# Patient Record
Sex: MALE | Race: WHITE | Employment: OTHER | URBAN - METROPOLITAN AREA
[De-identification: names, ages, dates, MRNs, and addresses within clinical notes are randomized per-mention and may not be internally consistent; named-entity substitution may affect disease eponyms.]

---

## 2017-02-03 ENCOUNTER — HOSPITAL ENCOUNTER (EMERGENCY)
Facility: HOSPITAL | Age: 62
Discharge: HOME | End: 2017-02-03
Attending: EMERGENCY MEDICINE
Payer: MEDICARE

## 2017-02-03 VITALS
HEIGHT: 70 IN | TEMPERATURE: 97 F | SYSTOLIC BLOOD PRESSURE: 158 MMHG | WEIGHT: 180 LBS | RESPIRATION RATE: 19 BRPM | BODY MASS INDEX: 25.77 KG/M2 | HEART RATE: 83 BPM | DIASTOLIC BLOOD PRESSURE: 86 MMHG | OXYGEN SATURATION: 100 %

## 2017-02-03 DIAGNOSIS — F90.9 ATTENTION DEFICIT HYPERACTIVITY DISORDER (ADHD), UNSPECIFIED ADHD TYPE: ICD-10-CM

## 2017-02-03 DIAGNOSIS — Z76.0 ENCOUNTER FOR MEDICATION REFILL: Primary | ICD-10-CM

## 2017-02-03 PROCEDURE — 99283 EMERGENCY DEPT VISIT LOW MDM: CPT

## 2017-02-03 RX ORDER — DEXTROAMPHETAMINE SACCHARATE, AMPHETAMINE ASPARTATE, DEXTROAMPHETAMINE SULFATE AND AMPHETAMINE SULFATE 7.5; 7.5; 7.5; 7.5 MG/1; MG/1; MG/1; MG/1
30 TABLET ORAL 2 TIMES DAILY
Qty: 13 TABLET | Refills: 0 | Status: SHIPPED | OUTPATIENT
Start: 2017-04-04 | End: 2017-04-09

## 2017-02-03 NOTE — ED PROVIDER NOTES
Patient Seen in: [facility] Emergency Department    History   Patient presents with:  Medication Request    Stated Complaint: PRESCRIPTION REFILL    HPI    he presents for medication refill.   He states that he takes Adderall twice a day sometimes difficulty  Eye:  No scleral icterus. Eyelids appear normal, no lesions. Musculoskeletal:  Good muscle tone. Skin:  Warm, dry, well perfused. Good skin turgor. No rashes seen. Neurology:  Moving all extremities equally with good coordination.   No cra

## 2017-06-01 ENCOUNTER — HOSPITAL ENCOUNTER (EMERGENCY)
Facility: HOSPITAL | Age: 62
Discharge: HOME | End: 2017-06-01
Attending: EMERGENCY MEDICINE
Payer: MEDICARE

## 2017-06-01 VITALS
TEMPERATURE: 99 F | RESPIRATION RATE: 16 BRPM | OXYGEN SATURATION: 100 % | DIASTOLIC BLOOD PRESSURE: 85 MMHG | SYSTOLIC BLOOD PRESSURE: 172 MMHG | WEIGHT: 180 LBS | HEART RATE: 99 BPM | BODY MASS INDEX: 26 KG/M2

## 2017-06-01 DIAGNOSIS — F90.9 ATTENTION DEFICIT HYPERACTIVITY DISORDER (ADHD), UNSPECIFIED ADHD TYPE: ICD-10-CM

## 2017-06-01 DIAGNOSIS — Z76.0 ENCOUNTER FOR MEDICATION REFILL: Primary | ICD-10-CM

## 2017-06-01 PROCEDURE — 99283 EMERGENCY DEPT VISIT LOW MDM: CPT

## 2017-06-01 RX ORDER — DEXTROAMPHETAMINE SACCHARATE, AMPHETAMINE ASPARTATE, DEXTROAMPHETAMINE SULFATE AND AMPHETAMINE SULFATE 7.5; 7.5; 7.5; 7.5 MG/1; MG/1; MG/1; MG/1
30 TABLET ORAL 4 TIMES DAILY
Qty: 16 TABLET | Refills: 0 | Status: SHIPPED | OUTPATIENT
Start: 2017-06-01 | End: 2017-06-05

## 2017-06-03 NOTE — ED PROVIDER NOTES
Patient Seen in: [facility] AND [facility] Emergency Department    History   Patient presents with:  Medication Administration      HPI    Patient presents requesting a refill of his Adderall.   He states that he is unable to follow-up with his psychiatrist until appears well-developed and well-nourished. No distress. HENT:   Head: Normocephalic and atraumatic. Nose: Nose normal.   Eyes: Right eye exhibits no discharge. Left eye exhibits no discharge. Cardiovascular: Normal rate. No murmur heard.   Pulmonar

## 2017-10-12 ENCOUNTER — HOSPITAL ENCOUNTER (EMERGENCY)
Facility: HOSPITAL | Age: 62
Discharge: HOME | End: 2017-10-12
Attending: EMERGENCY MEDICINE
Payer: MEDICARE

## 2017-10-12 ENCOUNTER — APPOINTMENT (OUTPATIENT)
Dept: GENERAL RADIOLOGY | Facility: HOSPITAL | Age: 62
End: 2017-10-12
Attending: EMERGENCY MEDICINE
Payer: MEDICARE

## 2017-10-12 VITALS
DIASTOLIC BLOOD PRESSURE: 82 MMHG | WEIGHT: 180 LBS | OXYGEN SATURATION: 97 % | HEART RATE: 71 BPM | RESPIRATION RATE: 18 BRPM | SYSTOLIC BLOOD PRESSURE: 142 MMHG | HEIGHT: 70 IN | BODY MASS INDEX: 25.77 KG/M2 | TEMPERATURE: 97 F

## 2017-10-12 DIAGNOSIS — M25.552 LEFT HIP PAIN: Primary | ICD-10-CM

## 2017-10-12 PROCEDURE — 73502 X-RAY EXAM HIP UNI 2-3 VIEWS: CPT | Performed by: EMERGENCY MEDICINE

## 2017-10-12 PROCEDURE — 99283 EMERGENCY DEPT VISIT LOW MDM: CPT

## 2017-10-12 RX ORDER — IBUPROFEN 600 MG/1
600 TABLET ORAL EVERY 8 HOURS PRN
Qty: 20 TABLET | Refills: 0 | Status: SHIPPED | OUTPATIENT
Start: 2017-10-12 | End: 2017-10-19

## 2017-10-12 RX ORDER — IBUPROFEN 600 MG/1
600 TABLET ORAL ONCE
Status: COMPLETED | OUTPATIENT
Start: 2017-10-12 | End: 2017-10-12

## 2017-10-12 NOTE — ED PROVIDER NOTES
Patient Seen in: [facility] AND [facility] Emergency Department    History   Patient presents with:  Lower Extremity Injury (musculoskeletal)    Stated Complaint: l hip pain    HPI    82-year-old male with history of ADHD here with complaints of chronic left hip in HPI. Constitutional and vital signs reviewed. All other systems reviewed and negative except as noted above. PSFH elements reviewed from today and agreed except as otherwise stated in HPI.     Physical Exam   ED Triage Vitals [10/12/17 0110]  BP hour(s)). Imaging Results Available and Reviewed by me while in ED:      X-ray pelvis and left hip 3 views      IMPRESSION:  -No evidence of acute bony or joint abnormality.     The results were faxed/finalized only on Thursday, October 12, 2017 at 03:25 multiple initial diagnoses were considered based on the presenting problem including fracture, arthritis, capsulitis.       Disposition and Plan     Clinical Impression:  Left hip pain  (primary encounter diagnosis)    Disposition:  Discharge    Follow-up:
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
## 2018-01-03 NOTE — ED PROVIDER NOTES
Patient Seen in: [facility] AND [facility] Emergency Department    History   Patient presents with:  Medication Administration      HPI    Patient presents requesting a refill of his Adderall.   States he takes 5.5 tablets per day and is not seeing a psychiatrist note and vitals reviewed.       ED Course      Labs Reviewed - No data to display      Imaging Results Available and Reviewed while in ED:     ED Medications Administered: Medications - No data to display      [facility]      01/02/18  2105   BP: 148/74   Pulse: 9 R-0    !! - Potential duplicate medications found. Please discuss with provider.

## 2018-02-11 ENCOUNTER — HOSPITAL ENCOUNTER (EMERGENCY)
Facility: HOSPITAL | Age: 63
Discharge: HOME | End: 2018-02-11
Attending: EMERGENCY MEDICINE
Payer: MEDICARE

## 2018-02-11 ENCOUNTER — APPOINTMENT (OUTPATIENT)
Dept: GENERAL RADIOLOGY | Facility: HOSPITAL | Age: 63
End: 2018-02-11
Attending: EMERGENCY MEDICINE
Payer: MEDICARE

## 2018-02-11 VITALS
DIASTOLIC BLOOD PRESSURE: 78 MMHG | BODY MASS INDEX: 24.34 KG/M2 | TEMPERATURE: 98 F | RESPIRATION RATE: 20 BRPM | WEIGHT: 170 LBS | HEIGHT: 70 IN | HEART RATE: 80 BPM | SYSTOLIC BLOOD PRESSURE: 169 MMHG | OXYGEN SATURATION: 99 %

## 2018-02-11 DIAGNOSIS — M21.611 BUNION OF RIGHT FOOT: ICD-10-CM

## 2018-02-11 DIAGNOSIS — M79.671 ACUTE FOOT PAIN, RIGHT: Primary | ICD-10-CM

## 2018-02-11 PROCEDURE — 73630 X-RAY EXAM OF FOOT: CPT | Performed by: EMERGENCY MEDICINE

## 2018-02-11 PROCEDURE — 99283 EMERGENCY DEPT VISIT LOW MDM: CPT

## 2018-02-11 NOTE — ED PROVIDER NOTES
Patient Seen in: [facility] AND [facility] Emergency Department    History   Patient presents with: Foot Pain    Stated Complaint: Right foot pain- \"bunion issues\"- states \"I got scared before my surgical date. *    HPI    Patient presents with right foot cesar otherwise stated in HPI.     Physical Exam   ED Triage Vitals [02/11/18 1014]  BP: (!) 174/88  Pulse: 82  Resp: 20  Temp: (!) 97.5 °F (36.4 °C)  Temp src: Temporal  SpO2: 98 %  O2 Device: None (Room air)    Current:BP (!) 169/78   Pulse 80   Temp (!) 97.5 ° We recommend that you schedule follow up care with a primary care provider within the next three months to obtain basic health screening including reassessment of your blood pressure.       Clinical Impression:  Acute foot pain, right  (primary encounte

## 2018-02-11 NOTE — CM/SW NOTE
Met w/patient at bedside for dcp, per patient has [address] to pay cab upon discharge if needed. States was on his way to [employer] and that is where he wants to go upon discharge.

## 2018-02-11 NOTE — ED NOTES
Pt to ED via [facility] with EMS- per EMS, pt from [facility]- where veterans go with no money- states got on the [place] and came to [address]- states chronic bunion to right foot. States supposed to have surgery but canceled the day before. Now, having pain.  P

## 2018-02-11 NOTE — ED INITIAL ASSESSMENT (HPI)
Pt states chronic right foot \"bunion issues. \" Pt states he was supposed to get surgery on this but \"wussd out the day before because they side my foot may split and not get back together. \" Steady gait observed.  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

## 2018-05-16 ENCOUNTER — HOSPITAL ENCOUNTER (EMERGENCY)
Facility: HOSPITAL | Age: 63
Discharge: HOME | End: 2018-05-17
Attending: EMERGENCY MEDICINE
Payer: MEDICARE

## 2018-05-16 DIAGNOSIS — S33.5XXA SPRAIN OF LOW BACK, INITIAL ENCOUNTER: Primary | ICD-10-CM

## 2018-05-16 PROCEDURE — 99283 EMERGENCY DEPT VISIT LOW MDM: CPT

## 2018-05-17 ENCOUNTER — APPOINTMENT (OUTPATIENT)
Dept: GENERAL RADIOLOGY | Facility: HOSPITAL | Age: 63
End: 2018-05-17
Attending: EMERGENCY MEDICINE
Payer: MEDICARE

## 2018-05-17 VITALS
RESPIRATION RATE: 18 BRPM | DIASTOLIC BLOOD PRESSURE: 78 MMHG | HEART RATE: 81 BPM | TEMPERATURE: 98 F | SYSTOLIC BLOOD PRESSURE: 133 MMHG | WEIGHT: 175 LBS | BODY MASS INDEX: 24.5 KG/M2 | HEIGHT: 71 IN | OXYGEN SATURATION: 100 %

## 2018-05-17 PROCEDURE — 72100 X-RAY EXAM L-S SPINE 2/3 VWS: CPT | Performed by: EMERGENCY MEDICINE

## 2018-05-17 RX ORDER — IBUPROFEN 600 MG/1
600 TABLET ORAL EVERY 8 HOURS PRN
Qty: 20 TABLET | Refills: 0 | Status: SHIPPED | OUTPATIENT
Start: 2018-05-17 | End: 2018-05-24

## 2018-05-17 RX ORDER — LIDOCAINE 50 MG/G
1 PATCH TOPICAL ONCE
Status: DISCONTINUED | OUTPATIENT
Start: 2018-05-17 | End: 2018-05-17

## 2018-05-17 RX ORDER — LIDOCAINE 50 MG/G
1 PATCH TOPICAL EVERY 24 HOURS
Qty: 5 PATCH | Refills: 0 | Status: SHIPPED | OUTPATIENT
Start: 2018-05-17 | End: 2018-05-22

## 2018-05-17 NOTE — ED PROVIDER NOTES
Patient Seen in: [facility] Emergency Department    History   Patient presents with:  Back Pain (musculoskeletal)    Stated Complaint: back pain     HPI    63-year-old male with history of ADHD here with complaints of acute onset back pain ×1 day. reviewed. All other systems reviewed and negative except as noted above.     Physical Exam   ED Triage Vitals [05/16/18 2350]  BP: 146/84  Pulse: 83  Resp: 18  Temp: 98.1 °F (36.7 °C)  Temp src: Oral  SpO2: 100 %  O2 Device: None (Room air)    Current: XRAY LUMBAR SPINE    Comparison: 9/22/2014    IMPRESSION:  No acute fracture or dislocation. Mild compression of the L1 vertebral body, similar compared to prior.   Mild degenerative changes are seen, most prominent at T12-L1 and L1-L2, slightly progre MAKING:  After obtaining the patient's history, performing the physical exam and reviewing the diagnostics, multiple initial diagnoses were considered based on the presenting problem including fracture, contusion, sprain, sciatica.         Disposition and P

## 2018-07-04 ENCOUNTER — HOSPITAL (OUTPATIENT)
Dept: OTHER | Age: 63
End: 2018-07-04
Attending: EMERGENCY MEDICINE

## 2018-12-25 ENCOUNTER — HOSPITAL ENCOUNTER (OUTPATIENT)
Age: 63
Discharge: HOME | End: 2018-12-25
Attending: FAMILY MEDICINE
Payer: MEDICARE

## 2018-12-25 VITALS
DIASTOLIC BLOOD PRESSURE: 90 MMHG | SYSTOLIC BLOOD PRESSURE: 169 MMHG | TEMPERATURE: 97 F | HEART RATE: 75 BPM | RESPIRATION RATE: 18 BRPM | OXYGEN SATURATION: 98 %

## 2018-12-25 DIAGNOSIS — M21.611 BUNION, RIGHT FOOT: Primary | ICD-10-CM

## 2018-12-25 PROCEDURE — 99212 OFFICE O/P EST SF 10 MIN: CPT

## 2018-12-25 NOTE — ED INITIAL ASSESSMENT (HPI)
PATIENT PRESENTS WITH A BUNION TO HIS RIGHT FOOT STATES HE HAS HAD IT \"FOR YEARS\" BUT IT SEEMS TO BE \"GETTING WORSE. \"  STATES PAIN INCREASES WITH AMBULATION. NO OPEN SORES NOTED TO 1ST OR SECOND TOES.

## 2018-12-25 NOTE — ED PROVIDER NOTES
Patient Seen in: [address]    History   Patient presents with:  Bunions    Stated Complaint: pain in foot    HPI     Patient is here with the severe bunion of the right foot.   Says it is hard for him to bear weight on th agreed.     Disposition:  Discharge  12/25/2018  5:37 pm    Follow-up:  Dot Alexandre DPM  [address]  [address]  [phone]    Schedule an appointment as soon as possible for a visit

## 2018-12-30 ENCOUNTER — HOSPITAL ENCOUNTER (EMERGENCY)
Facility: HOSPITAL | Age: 63
Discharge: HOME | End: 2018-12-30
Attending: EMERGENCY MEDICINE
Payer: MEDICARE

## 2018-12-30 VITALS
OXYGEN SATURATION: 100 % | WEIGHT: 180 LBS | DIASTOLIC BLOOD PRESSURE: 86 MMHG | SYSTOLIC BLOOD PRESSURE: 160 MMHG | TEMPERATURE: 99 F | HEART RATE: 89 BPM | RESPIRATION RATE: 18 BRPM | BODY MASS INDEX: 26 KG/M2
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
## 2018-12-30 DIAGNOSIS — S80.01XA CONTUSION OF RIGHT KNEE, INITIAL ENCOUNTER: ICD-10-CM

## 2018-12-30 PROCEDURE — 99282 EMERGENCY DEPT VISIT SF MDM: CPT

## 2018-12-30 NOTE — ED NOTES
Pt verbalized understanding of discharge and follow up instructions, along with prescriptions.  Denies additional questioning  Stable upon discharge

## 2018-12-30 NOTE — ED PROVIDER NOTES
Patient Seen in: [facility] AND [facility] Emergency Department    History   Patient presents with:  Medication Request    Stated Complaint: MEDICATION REFILL    HPI    The patient is a 63-year-old male with a history of ADHD who presents for refill of his Adder heard.  Pulmonary/Chest: Effort normal and breath sounds normal.   Abdominal: Soft. Bowel sounds are normal. He exhibits no distension. There is no tenderness. Musculoskeletal: Normal range of motion.    Neurological: He is alert and oriented to person, p

## 2018-12-31 NOTE — ED PROVIDER NOTES
Patient Seen in: [facility] Emergency Department    History   Patient presents with:  Fall (musculoskeletal, neurologic)  Back Pain (musculoskeletal)      HPI    Patient presents to the ED after reportedly falling outside the ED.   He states that h deformity, laxity or reduced range of motion. Neurological: He is alert. Skin: Skin is warm and dry. Psychiatric: He has a normal mood and affect. His behavior is normal.   Nursing note and vitals reviewed.       ED Course      Labs Reviewed - No data possible for a visit in 1 week        Medications Prescribed:  Discharge Medication List as of 12/30/2018 10:32 PM

## 2018-12-31 NOTE — ED NOTES
PT STATED THAT INITIALLY HE CAME IN FOR THE SCRIPT [facility] [place] FILL HIS SCRIPT TILL TOMORROW. PT STATED THAT HE FELL BY THE ENTRANCE OF THE  INTO THE BUSHES. PT SAID THAT HE PROBABLY FELL ASLEEP WHILE WALKING DUE TO NARCOLEPSY.  PT C/O NOW OF TH

## 2019-08-01 ENCOUNTER — HOSPITAL ENCOUNTER (EMERGENCY)
Facility: HOSPITAL | Age: 64
Discharge: HOME | End: 2019-08-01
Attending: EMERGENCY MEDICINE
Payer: MEDICARE

## 2019-08-01 VITALS
DIASTOLIC BLOOD PRESSURE: 86 MMHG | WEIGHT: 185 LBS | RESPIRATION RATE: 18 BRPM | BODY MASS INDEX: 26.48 KG/M2 | OXYGEN SATURATION: 99 % | HEIGHT: 70 IN | HEART RATE: 79 BPM | SYSTOLIC BLOOD PRESSURE: 152 MMHG | TEMPERATURE: 98 F

## 2019-08-01 DIAGNOSIS — Z76.0 ENCOUNTER FOR MEDICATION REFILL: Primary | ICD-10-CM

## 2019-08-01 PROCEDURE — 99282 EMERGENCY DEPT VISIT SF MDM: CPT
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
## 2019-08-31 ENCOUNTER — HOSPITAL ENCOUNTER (EMERGENCY)
Facility: HOSPITAL | Age: 64
Discharge: HOME | End: 2019-08-31
Attending: EMERGENCY MEDICINE
Payer: MEDICARE

## 2019-08-31 VITALS
OXYGEN SATURATION: 96 % | DIASTOLIC BLOOD PRESSURE: 92 MMHG | TEMPERATURE: 98 F | HEART RATE: 91 BPM | SYSTOLIC BLOOD PRESSURE: 164 MMHG | RESPIRATION RATE: 18 BRPM

## 2019-08-31 DIAGNOSIS — Z76.0 ENCOUNTER FOR MEDICATION REFILL: Primary | ICD-10-CM

## 2019-08-31 PROCEDURE — 99283 EMERGENCY DEPT VISIT LOW MDM: CPT

## 2019-08-31 RX ORDER — DEXTROAMPHETAMINE SACCHARATE, AMPHETAMINE ASPARTATE, DEXTROAMPHETAMINE SULFATE AND AMPHETAMINE SULFATE 7.5; 7.5; 7.5; 7.5 MG/1; MG/1; MG/1; MG/1
30 TABLET ORAL
Qty: 20 TABLET | Refills: 0 | Status: SHIPPED | OUTPATIENT
Start: 2019-08-31 | End: 2019-09-05

## 2019-08-31 NOTE — ED INITIAL ASSESSMENT (HPI)
Pt presents with med request for Adderol. Pt seen by psychiatrist 2-3 wks ago. Denies n/v/d/f/c, abd pain, HA. Denies PMH  AOx3, RR even/nonlabored.

## 2019-08-31 NOTE — ED PROVIDER NOTES
Patient Seen in: [facility] Emergency Department    History   Patient presents with:  Medication Request    Stated Complaint: adderall prescription     HPI    Patient presents the emergency department stating that he ran out of his Adderall prescr supple. Cardiovascular: Normal rate and regular rhythm. No murmur heard. Pulmonary/Chest: Effort normal and breath sounds normal. No respiratory distress. Abdominal: Soft. He exhibits no distension. There is no tenderness.    Musculoskeletal: Normal

## 2019-08-31 NOTE — ED NOTES
Pt verbalized understanding of dc instructions, follow up with PMD. Pt hemodynamically stable. Well appearing.

## 2020-04-13 ENCOUNTER — HOSPITAL ENCOUNTER (EMERGENCY)
Facility: HOSPITAL | Age: 65
Discharge: HOME | End: 2020-04-13
Attending: EMERGENCY MEDICINE
Payer: MEDICARE

## 2020-04-13 VITALS
DIASTOLIC BLOOD PRESSURE: 90 MMHG | RESPIRATION RATE: 16 BRPM | TEMPERATURE: 97 F | SYSTOLIC BLOOD PRESSURE: 168 MMHG | HEIGHT: 70 IN | HEART RATE: 74 BPM | BODY MASS INDEX: 25.77 KG/M2 | WEIGHT: 180 LBS | OXYGEN SATURATION: 96 %

## 2020-04-13 DIAGNOSIS — H61.21 IMPACTED CERUMEN OF RIGHT EAR: Primary | ICD-10-CM

## 2020-04-13 PROCEDURE — 69209 REMOVE IMPACTED EAR WAX UNI: CPT

## 2020-04-13 NOTE — ED NOTES
Discharge instructions given to pt. Pt verbalized understanding of home care and ambulatory out of ED with steady gait. Pt discharged in stable condition.

## 2020-04-13 NOTE — ED PROVIDER NOTES
Patient Seen in: [facility] AND [facility] Emergency Department    History   Patient presents with:  Ear Problem Pain    Stated Complaint: cerumen impaction in right ear    HPI    Patient is here because he thinks he has wax stuck in his right ear.   He states he lesions. HEENT: Patient right ear has 100% cerumen impaction. There is no redness or occlusion of the canal the left has approximately 20 to 30% small amount of wax but otherwise the tympanic membrane appears normal  Musculoskeletal:  Good muscle tone.

## 2020-04-13 NOTE — ED INITIAL ASSESSMENT (HPI)
Pt reports that he feel discomfort in his right ear and decreased hearing that began this am after showering.

## 2020-12-19 ENCOUNTER — HOSPITAL ENCOUNTER (EMERGENCY)
Facility: HOSPITAL | Age: 65
Discharge: HOME | End: 2020-12-19
Attending: EMERGENCY MEDICINE
Payer: MEDICARE

## 2020-12-19 VITALS
RESPIRATION RATE: 20 BRPM | TEMPERATURE: 98 F | BODY MASS INDEX: 25.77 KG/M2 | SYSTOLIC BLOOD PRESSURE: 184 MMHG | HEIGHT: 70 IN | OXYGEN SATURATION: 97 % | WEIGHT: 180 LBS | DIASTOLIC BLOOD PRESSURE: 79 MMHG | HEART RATE: 75 BPM

## 2020-12-19 DIAGNOSIS — R03.0 ELEVATED BLOOD PRESSURE READING WITHOUT DIAGNOSIS OF HYPERTENSION: ICD-10-CM

## 2020-12-19 DIAGNOSIS — Z76.0 ENCOUNTER FOR MEDICATION REFILL: Primary | ICD-10-CM

## 2020-12-19 DIAGNOSIS — F90.9 ATTENTION DEFICIT HYPERACTIVITY DISORDER (ADHD), UNSPECIFIED ADHD TYPE: ICD-10-CM

## 2020-12-19 PROCEDURE — 99283 EMERGENCY DEPT VISIT LOW MDM: CPT

## 2020-12-19 RX ORDER — DEXTROAMPHETAMINE SACCHARATE, AMPHETAMINE ASPARTATE, DEXTROAMPHETAMINE SULFATE AND AMPHETAMINE SULFATE 7.5; 7.5; 7.5; 7.5 MG/1; MG/1; MG/1; MG/1
30 TABLET ORAL DAILY
Qty: 5 TABLET | Refills: 0 | Status: SHIPPED | OUTPATIENT
Start: 2020-12-19 | End: 2020-12-19

## 2020-12-19 RX ORDER — DEXTROAMPHETAMINE SACCHARATE, AMPHETAMINE ASPARTATE, DEXTROAMPHETAMINE SULFATE AND AMPHETAMINE SULFATE 7.5; 7.5; 7.5; 7.5 MG/1; MG/1; MG/1; MG/1
30 TABLET ORAL
Qty: 25 TABLET | Refills: 0 | Status: SHIPPED | OUTPATIENT
Start: 2020-12-19 | End: 2020-12-24

## 2020-12-19 NOTE — ED PROVIDER NOTES
Patient Seen in: [facility] AND [facility] Emergency Department      History   Patient presents with:  Medication Administration    Stated Complaint: Doctor not in.     HPI    The patient is a 82-year-old male with a history of ADHD who presents because he has be Mouth/Throat:      Mouth: Mucous membranes are moist.   Eyes:      Extraocular Movements: Extraocular movements intact. Conjunctiva/sclera: Conjunctivae normal.      Pupils: Pupils are equal, round, and reactive to light.    Neck:      Vascular: No JVD diagnosis of hypertension    Disposition:  Discharge  12/19/2020  1:30 pm    Follow-up:  Peiter Avalos MD  [facility] [address], [address]  [address] [facility] [address] [phone]    Sc

## 2021-07-05 ENCOUNTER — HOSPITAL ENCOUNTER (EMERGENCY)
Facility: HOSPITAL | Age: 66
Discharge: HOME | End: 2021-07-05
Attending: EMERGENCY MEDICINE
Payer: MEDICARE

## 2021-07-05 VITALS
HEART RATE: 99 BPM | WEIGHT: 185 LBS | SYSTOLIC BLOOD PRESSURE: 165 MMHG | TEMPERATURE: 98 F | OXYGEN SATURATION: 99 % | RESPIRATION RATE: 18 BRPM | BODY MASS INDEX: 27 KG/M2 | DIASTOLIC BLOOD PRESSURE: 79 MMHG

## 2021-07-05 DIAGNOSIS — Z76.0 ENCOUNTER FOR MEDICATION REFILL: Primary | ICD-10-CM

## 2021-07-05 PROCEDURE — 99283 EMERGENCY DEPT VISIT LOW MDM: CPT

## 2021-07-05 RX ORDER — DEXTROAMPHETAMINE SACCHARATE, AMPHETAMINE ASPARTATE, DEXTROAMPHETAMINE SULFATE AND AMPHETAMINE SULFATE 7.5; 7.5; 7.5; 7.5 MG/1; MG/1; MG/1; MG/1
30 TABLET ORAL
Qty: 25 TABLET | Refills: 0 | Status: SHIPPED | OUTPATIENT
Start: 2021-07-05 | End: 2021-07-10

## 2021-07-05 NOTE — ED PROVIDER NOTES
Patient Seen in: [facility] Emergency Department      History   Patient presents with:  Medication Request    Stated Complaint: med refill    HPI/Subjective:   HPI  Patient is a 68-year-old male history of ADHD on Adderall presents with medication He is not in acute distress. Appearance: Normal appearance. He is not toxic-appearing. HENT:      Head: Normocephalic. Mouth/Throat:      Mouth: Mucous membranes are moist.   Eyes:      Extraocular Movements: Extraocular movements intact.       [address]    Schedule an appointment as soon as possible for a visit      We recommend that you schedule follow up care with a primary care provider within the next three months to obtain basic health screening includ

## 2022-03-18 ENCOUNTER — HOSPITAL ENCOUNTER (EMERGENCY)
Facility: HOSPITAL | Age: 67
Discharge: HOME | End: 2022-03-18
Attending: EMERGENCY MEDICINE
Payer: MEDICARE

## 2022-03-18 VITALS
RESPIRATION RATE: 18 BRPM | OXYGEN SATURATION: 100 % | SYSTOLIC BLOOD PRESSURE: 172 MMHG | HEART RATE: 84 BPM | TEMPERATURE: 98 F | DIASTOLIC BLOOD PRESSURE: 86 MMHG

## 2022-03-18 DIAGNOSIS — R03.0 ELEVATED BLOOD PRESSURE READING WITHOUT DIAGNOSIS OF HYPERTENSION: ICD-10-CM

## 2022-03-18 DIAGNOSIS — H61.23 BILATERAL HEARING LOSS DUE TO CERUMEN IMPACTION: Primary | ICD-10-CM

## 2022-03-18 PROCEDURE — 99283 EMERGENCY DEPT VISIT LOW MDM: CPT

## 2022-03-18 NOTE — ED INITIAL ASSESSMENT (HPI)
Pt to ED for left ear pain onset two days ago with trouble hearing out of left ear. Denies cough, fever, recent illness.

## 2022-09-02 ENCOUNTER — HOSPITAL ENCOUNTER (EMERGENCY)
Facility: HOSPITAL | Age: 67
Discharge: HOME | End: 2022-09-02
Attending: EMERGENCY MEDICINE
Payer: MEDICARE

## 2022-09-02 VITALS
TEMPERATURE: 98 F | RESPIRATION RATE: 18 BRPM | OXYGEN SATURATION: 98 % | DIASTOLIC BLOOD PRESSURE: 88 MMHG | SYSTOLIC BLOOD PRESSURE: 174 MMHG | WEIGHT: 185 LBS | BODY MASS INDEX: 27 KG/M2 | HEART RATE: 74 BPM

## 2022-09-02 DIAGNOSIS — Z76.0 ENCOUNTER FOR MEDICATION REFILL: Primary | ICD-10-CM

## 2022-09-02 PROCEDURE — 99283 EMERGENCY DEPT VISIT LOW MDM: CPT

## 2022-09-02 RX ORDER — DEXTROAMPHETAMINE SACCHARATE, AMPHETAMINE ASPARTATE, DEXTROAMPHETAMINE SULFATE AND AMPHETAMINE SULFATE 7.5; 7.5; 7.5; 7.5 MG/1; MG/1; MG/1; MG/1
30 TABLET ORAL
Qty: 42 TABLET | Refills: 0 | Status: SHIPPED | OUTPATIENT
Start: 2022-09-02 | End: 2022-09-11

## 2022-10-16 ENCOUNTER — HOSPITAL ENCOUNTER (EMERGENCY)
Facility: HOSPITAL | Age: 67
Discharge: HOME | End: 2022-10-16
Attending: EMERGENCY MEDICINE
Payer: MEDICARE

## 2022-10-16 VITALS
SYSTOLIC BLOOD PRESSURE: 174 MMHG | HEART RATE: 85 BPM | DIASTOLIC BLOOD PRESSURE: 93 MMHG | OXYGEN SATURATION: 99 % | WEIGHT: 185 LBS | BODY MASS INDEX: 25.9 KG/M2 | HEIGHT: 71 IN | TEMPERATURE: 97 F | RESPIRATION RATE: 20 BRPM

## 2022-10-16 DIAGNOSIS — Z76.0 ENCOUNTER FOR MEDICATION REFILL: Primary | ICD-10-CM

## 2022-10-16 PROCEDURE — 99283 EMERGENCY DEPT VISIT LOW MDM: CPT

## 2022-10-16 RX ORDER — DEXTROAMPHETAMINE SACCHARATE, AMPHETAMINE ASPARTATE, DEXTROAMPHETAMINE SULFATE AND AMPHETAMINE SULFATE 7.5; 7.5; 7.5; 7.5 MG/1; MG/1; MG/1; MG/1
TABLET ORAL
Qty: 84 TABLET | Refills: 0 | Status: SHIPPED | OUTPATIENT
Start: 2022-10-16 | End: 2022-10-30

## 2024-02-14 ENCOUNTER — HOSPITAL ENCOUNTER (EMERGENCY)
Facility: HOSPITAL | Age: 69
Discharge: HOME | End: 2024-02-14
Attending: EMERGENCY MEDICINE
Payer: MEDICARE

## 2024-02-14 VITALS
HEART RATE: 80 BPM | BODY MASS INDEX: 27.2 KG/M2 | WEIGHT: 190 LBS | SYSTOLIC BLOOD PRESSURE: 172 MMHG | DIASTOLIC BLOOD PRESSURE: 94 MMHG | TEMPERATURE: 97 F | OXYGEN SATURATION: 99 % | HEIGHT: 70 IN | RESPIRATION RATE: 18 BRPM

## 2024-02-14 DIAGNOSIS — Z76.0 ENCOUNTER FOR MEDICATION REFILL: Primary | ICD-10-CM

## 2024-02-14 PROCEDURE — 99283 EMERGENCY DEPT VISIT LOW MDM: CPT

## 2024-02-14 PROCEDURE — 99282 EMERGENCY DEPT VISIT SF MDM: CPT

## 2024-02-14 RX ORDER — DEXTROAMPHETAMINE SACCHARATE, AMPHETAMINE ASPARTATE, DEXTROAMPHETAMINE SULFATE AND AMPHETAMINE SULFATE 7.5; 7.5; 7.5; 7.5 MG/1; MG/1; MG/1; MG/1
30 TABLET ORAL 4 TIMES DAILY
Qty: 60 TABLET | Refills: 0 | Status: SHIPPED | OUTPATIENT
Start: 2024-02-14 | End: 2024-02-29

## 2024-02-14 RX ORDER — DEXTROAMPHETAMINE SACCHARATE, AMPHETAMINE ASPARTATE, DEXTROAMPHETAMINE SULFATE AND AMPHETAMINE SULFATE 7.5; 7.5; 7.5; 7.5 MG/1; MG/1; MG/1; MG/1
60 TABLET ORAL EVERY MORNING
Qty: 30 TABLET | Refills: 0 | Status: SHIPPED | OUTPATIENT
Start: 2024-02-14 | End: 2024-02-29

## 2024-02-14 NOTE — ED PROVIDER NOTES
Patient Seen in: [facility] Emergency Department      History     Chief Complaint   Patient presents with    Medication Request     Stated Complaint: Med request    Subjective:   HPI  68-year-old male with ADHD presenting for medication refill.  He has been out of his Adderall for 4 days.  He cannot get into see Dr. Chavez until 2/29/2024.  He is requesting refill.  He takes 60 mg in the morning and 30 mg 4 times a day afterwards.  He has no other complaints or issues he would like to have addressed at this time.      Objective:   Past Medical History:   Diagnosis Date    ADHD (attention deficit hyperactivity disorder)               No pertinent past surgical history.              No pertinent social history.            Review of Systems    Positive for stated complaint: Med request  Other systems are as noted in HPI.  Constitutional and vital signs reviewed.      All other systems reviewed and negative except as noted above.    Physical Exam     ED Triage Vitals [02/14/24 1557]   BP (!) 184/99   Pulse 77   Resp 18   Temp 97.1 °F (36.2 °C)   Temp src Temporal   SpO2 98 %   O2 Device None (Room air)       Current:BP (!) 184/99   Pulse 77   Temp 97.1 °F (36.2 °C) (Temporal)   Resp 18   Ht 177.8 cm (5' 10\")   Wt 86.2 kg   SpO2 98%   BMI 27.26 kg/m²         Physical Exam  Vitals and nursing note reviewed.   Constitutional:       Appearance: He is well-developed.   HENT:      Head: Normocephalic and atraumatic.   Eyes:      Extraocular Movements: Extraocular movements intact.   Cardiovascular:      Rate and Rhythm: Normal rate and regular rhythm.   Pulmonary:      Effort: Pulmonary effort is normal.      Breath sounds: Normal breath sounds.   Musculoskeletal:         General: Normal range of motion.      Cervical back: Normal range of motion.   Skin:     General: Skin is warm.      Capillary Refill: Capillary refill takes less than 2 seconds.   Neurological:      Mental Status: He is alert.       Comments: No focal deficits               ED Course   Labs Reviewed - No data to display                   MDM                Medical Decision Making  Given a 15-day supply of his Adderall and instructed to follow-up with his psychiatrist.    Problems Addressed:  Encounter for medication refill: acute illness or injury    Risk  Prescription drug management.  Risk Details: [place]  reviewed        Disposition and Plan     Clinical Impression:  1. Encounter for medication refill         Disposition:  Discharge  2/14/2024  4:50 pm    Follow-up:  Ramana Chavez MD  [address] [facility] [address] [facility] [address]  [address]  [facility] [address]  [phone]    Follow up on 2/29/2024      We recommend that you schedule follow up care with a primary care provider within the next three months to obtain basic health screening including reassessment of your blood pressure.      Medications Prescribed:  Current Discharge Medication List        START taking these medications    Details   !! amphetamine-dextroamphetamine (ADDERALL) 30 MG Oral Tab Take 2 tablets (60 mg total) by mouth every morning for 15 days.  Qty: 30 tablet, Refills: 0    Associated Diagnoses: Encounter for medication refill      !! amphetamine-dextroamphetamine (ADDERALL) 30 MG Oral Tab Take 1 tablet (30 mg total) by mouth 4 (four) times daily for 15 days.  Qty: 60 tablet, Refills: 0    Associated Diagnoses: Encounter for medication refill       !! - Potential duplicate medications found. Please discuss with provider.

## 2024-02-14 NOTE — ED INITIAL ASSESSMENT (HPI)
Pt presents to the ED requesting Adderall refill 30mg tablets. Pt reports being out of his medication for four days.

## 2024-07-03 NOTE — ED INITIAL ASSESSMENT (HPI)
Patient presents to the ED with right sided flank pain that started this AM. No history of kidney stones. No urinary symptoms. Denies any fevers.         Triage:  Patient has been in the ER for many hours roaming the waiting area attempting to get a new Adderrall prescription after he lost his this afternoon after being seen in ER this AM. Patient requesting a cab voucher.  Was told he could not get a cab vo

## 2024-10-24 ENCOUNTER — TELEPHONE (OUTPATIENT)
Dept: ORTHOPEDICS CLINIC | Facility: CLINIC | Age: 69
End: 2024-10-24

## 2024-10-24 DIAGNOSIS — M79.671 RIGHT FOOT PAIN: Primary | ICD-10-CM

## 2024-10-24 NOTE — TELEPHONE ENCOUNTER
Patient is scheduled for right foot bunion. No x-rays in Epic. Please advise if imaging is needed. Already let the patient know to arrive 15 min early for x-rays.  Future Appointments   Date Time Provider Department Center   10/25/2024 11:00 AM Ladi Moreno DPM [facility] [place]

## 2024-10-25 ENCOUNTER — HOSPITAL ENCOUNTER (OUTPATIENT)
Dept: GENERAL RADIOLOGY | Age: 69
Discharge: HOME | End: 2024-10-25
Attending: PODIATRIST
Payer: MEDICARE

## 2024-10-25 ENCOUNTER — OFFICE VISIT (OUTPATIENT)
Dept: ORTHOPEDICS CLINIC | Facility: CLINIC | Age: 69
End: 2024-10-25
Payer: MEDICARE

## 2024-10-25 VITALS — BODY MASS INDEX: 27.2 KG/M2 | HEIGHT: 70 IN | WEIGHT: 190 LBS

## 2024-10-25 DIAGNOSIS — M79.671 RIGHT FOOT PAIN: ICD-10-CM

## 2024-10-25 DIAGNOSIS — F17.200 CURRENT EVERY DAY SMOKER: ICD-10-CM

## 2024-10-25 DIAGNOSIS — M85.879 OSTEOPENIA OF FOOT, UNSPECIFIED LATERALITY: ICD-10-CM

## 2024-10-25 DIAGNOSIS — M21.619 BUNION: Primary | ICD-10-CM

## 2024-10-25 DIAGNOSIS — M20.41 HAMMER TOE OF RIGHT FOOT: ICD-10-CM

## 2024-10-25 PROCEDURE — 73630 X-RAY EXAM OF FOOT: CPT | Performed by: PODIATRIST

## 2024-10-25 NOTE — PROGRESS NOTES
[facility] New Patient Note    CC:   Chief Complaint   Patient presents with    Foot Pain     Right foot pain  -bunion on right foot       HPI: The patient is a 69 year old male who presents today with complaints of a painful bunion and elevated toe for some time.  This is of his right foot    Bunion for some time    Not really painful all the time but he can only wear athletic shoes  Shoes some hurt  Patient is an every day smoker        Past Medical History:    ADHD (attention deficit hyperactivity disorder)     Past Surgical History:   Procedure Laterality Date    Repair ing hernia,7+y/o,reducibl       Current Outpatient Medications   Medication Sig Dispense Refill    Amphetamine-Dextroamphetamine (ADDERALL OR) Take 30 mg by mouth.       Allergies[1]  History reviewed. No pertinent family history.  Social History     Occupational History    Not on file   Tobacco Use    Smoking status: Every Day     Current packs/day: 0.50     Types: Cigarettes    Smokeless tobacco: Never   Vaping Use    Vaping status: Never Used   Substance and Sexual Activity    Alcohol use: Never    Drug use: Never    Sexual activity: Not on file        ROS:  Complete ROS reviewed by me and non-contributory to the chief complaint except as mentioned above.    Physical Exam:    Ht 5' 10\" (1.778 m)   Wt 190 lb (86.2 kg)   BMI 27.26 kg/m²       Exam severe hallux valgus with significant hallux abductus overriding second digit.  Adaptive changes to the dorsal second digit.  Fungal nail right great toe  Deformity is rigid and we are not able to reduce the abductus.  Limited first MP joint motion and pain  Sensation is intact sharp versus dull.  She can feel light touch to the tips of the toes  Palpable pedal pulses.  Hair growth is present.  Skin is supple and feet are well perfused and warm.    Strength is 5 out of 5 all muscle groups    Full range of motion and nonpainful motion of the ankle joint, subtalar joint, MP joints, and  midfoot joints.     Imaging:  xrays WB right foot  Increased intermetatarsal angle with significant hallux abductus and overlying second digit.  Osteopenic bone.  Personally viewed, independently interpreted and radiology report read.      Assessment/Diagnoses:  Diagnoses and all orders for this visit:    Bunion    Hammer toe of right foot    Current every day smoker    Osteopenia of foot, unspecified laterality        Plan:  I reviewed imaging and exam findings with the patient.  We looked at his x-rays together.  He has a pretty severe bunion deformity with overlapping of the second toe and he has off-and-on issues with infection blistering and just discomfort.  He has very limited shoe gear that he can wear.  My concern is his bone health and also long-term smoking for healing of surgery for the bunion    We talked about change in shoe gear and accommodation and padding of the bunion but this is pretty limited as the bunion is very severe.  He also has balance issues because of the toes  I am recommending surgery but I am concerned about his bone and also with a smoking history.  This can delay healing and fusion, we did discuss this.    Proposed first metatarsal phalangeal joint fusion  was discussed in detail including use of hardware and details of the procedure.  MAC with local anesthesia or general anesthesia as well as complications of anesthesia discussed.    Post operative details including 4 weeks of non weightbearing and 10-12 weeks to fusion was talked about.  Potential complications including infection, non union, delayed union, need to remove hardware, continued pain, recurrence of deformity, among others was discussed.  Patient's questions were answered and they would like to proceed.     SURGERY SCHEDULING SHEET    Will come back a month before to have pre op visit  May need help with transport  Looking at february    Mahesh Braden II  4/4/1955  [id number]    Procedure: Right first mpj fusion        Diagnosis:  Bunion [M21.619]  Hallux rigidus right foot      Anesthesia: General     Length of Surgery: 1 hr    Disposition: Outpatient    Special Equipment: Fishman    Assist: Yes ]    Pre-op Testing: PER ANESTHESIA GUIDELINES    Clearance: HISTORY AND PHYSICAL     Post op: Day after surgery    DME: Cam [employer]  Knee scooter  Request fluoroscan      Ladi Moreno DPMPodiatric Surgery  [facility] [place] [facility]  [address] [place] [address]   [address] [place] [address]   [address] [place], [place] [address] [place] [address].[email or web address]  [email or web address]  t: [phone]   f: [phone]              This document was partially prepared using Dragon Medical voice recognition software.            [1] No Known Allergies

## 2024-10-28 NOTE — TELEPHONE ENCOUNTER
Date of Surgery: 02/03/2025     Post Op Appt: 02/04/2025 130PM    Case ID: [id number]    Notes:     SURGERY SCHEDULING SHEET     Will come back a month before to have pre op visit  May need help with transport  Looking at february     Mahesh Phamshakira SEN  4/4/1955  [id number]     Procedure: Right first mpj fusion        Diagnosis:  Bunion [M21.619]  Hallux rigidus right foot        Anesthesia: General      Length of Surgery: 1 hr     Disposition: Outpatient     Special Equipment: Fishman     Assist: Yes ]     Pre-op Testing: PER ANESTHESIA GUIDELINES     Clearance: HISTORY AND PHYSICAL      Post op: Day after surgery     DME: Cam [employer]  Knee scooter  Request fluoroscan        Ladi Moreno DPodiatric Surgery  [facility]  [address] [place] [address], [address] [address]   [address] [place] [address]   [address] [place], [place] [address] [place] [address].[email or web address]  [email or web address]  t: [phone]   f: [phone]

## 2024-10-30 NOTE — TELEPHONE ENCOUNTER
S/P went over surgical protocol and scheduled Post Op Appointment. Patient had no other questions or concerns. I did advise the Patient if they had any additional questions to give us a call back for further assistance. Medical clearance Request sent

## 2025-01-23 NOTE — TELEPHONE ENCOUNTER
Ladi Moreno DPM to [facility] Clinical Pool        1/23/25  3:06 PM  Ok  He is a smoker and not a great candidate.  Needs to have clearance.  Also he was to come in within the month to go over everything again and he did not schedule that.  It has been awhile since I saw him.  We can cancel for now.  MAXIMILIANO

## 2025-01-23 NOTE — TELEPHONE ENCOUNTER
KACY    Spoke with Harleen at preadmission testing who wanted to let us know they have not been able to get a hold of the patient.  Confirmed correct phone number [phone].  No other contacts to reach out to.     DOS 2/3/25  Right first mpj fusion

## 2025-01-23 NOTE — TELEPHONE ENCOUNTER
Got a call today from presurgical testing that they have been unable to get a hold of patient for surgery scheduled for 2/3/25.  Forwarded Dr Moreno's message to them as well.       Ladi Moreno DPM to [facility]        1/23/25  3:06 PM  Ok  He is a smoker and not a great candidate.  Needs to have clearance.  Also he was to come in within the month to go over everything again and he did not schedule that.  It has been awhile since I saw him.  We can cancel for now.  JF

## 2025-01-27 DIAGNOSIS — M21.619 BUNION: Primary | ICD-10-CM

## 2025-01-27 DIAGNOSIS — M20.21 HALLUX RIGIDUS, RIGHT FOOT: ICD-10-CM

## (undated) NOTE — ED AVS SNAPSHOT
Susan Grubbs II   MRN: [id number]    Department:  [facility] Emergency Department   Date of Visit:  1/2/2018           Disclosure     Insurance plans vary and the physician(s) referred by the ER may not be covered by your plan.  Please contac within the next three months to obtain basic health screening including reassessment of your blood pressure.     IF THERE IS ANY CHANGE OR WORSENING OF YOUR CONDITION, CALL YOUR PRIMARY CARE PHYSICIAN AT ONCE OR RETURN IMMEDIATELY TO THE EMERGENCY DEPARTMEN

## (undated) NOTE — ED AVS SNAPSHOT
[facility] Emergency Department    [address]     [address] [place] [address]    Phone:  [phone]    Fax:  [phone]           Patricia Mahsa   MRN: [id number]    Department:  [facility] Emergency Department   Date of Visit:  6/1/ coverage and benefits available for follow-up care and referrals. If you have difficulty scheduling your follow-up appointment as directed, please call our  at [phone].      Si tiene problemas para programar leticia hank de seguimiento s different from what your Primary Care doctor has instructed you - please continue to take your medications as instructed by your Primary Care doctor until you can check with your doctor. Please bring the medication list to your next doctor's appointment. can help with your Affordable Care Act coverage, as well as all types of Medicaid plans. To get signed up and covered, please call [phone] and ask to get set up for an insurance coverage that is in-network with Chelsea Licona

## (undated) NOTE — ED AVS SNAPSHOT
Denver Simons II   MRN: [id number]    Department:  [facility] Emergency Department   Date of Visit:  8/31/2019           Disclosure     Insurance plans vary and the physician(s) referred by the ER may not be covered by your plan.  Please conta within the next three months to obtain basic health screening including reassessment of your blood pressure.     IF THERE IS ANY CHANGE OR WORSENING OF YOUR CONDITION, CALL YOUR PRIMARY CARE PHYSICIAN AT ONCE OR RETURN IMMEDIATELY TO THE EMERGENCY DEPARTMEN

## (undated) NOTE — LETTER
Date & Time: 12/25/2018, 5:37 PM  Patient: Amelie Dire II  Encounter Provider(s):    Devin Galarza MD       To Whom It May Concern:    Сергей Pereaf was seen and treated in our department on 12/25/2018.  He is recommended to not bear weight on

## (undated) NOTE — LETTER
10/31/24  [facility] Orthopedic Surgery   Pre-Operative Clearance Request    Patient Name:   Mahesh Braden II             :   1955    Surgeon: Dr. Moreno             Date of Surgery: 2025    Surgical Procedure: Right first metatarsophalangeal Joint       PLEASE COMPLETE THE FOLLOWING AND SIGN OFF ON CLEARANCE WITHIN 7 DAYS OF SCHEDULED SURGERY TO AVOID CANCELLATION.     [x]  History and Physical        [x]  Medical  Clearance                                 **Please fax test results, H&P, and clearance to [phone] and to [facility] at [phone]**

## (undated) NOTE — ED AVS SNAPSHOT
[facility] Emergency Department    [address] [place] [address]    Phone:  [phone]    Fax:  [phone]           John Deshpande   MRN: [id number]    Department:  [facility] Emergency Department   Date of Visit:  2/3/ covered by your plan. Please contact your insurance company to determine coverage and benefits available for follow-up care and referrals.       If you have difficulty scheduling your follow-up appointment as directed, please call our  at [phone]) If you believe that any of the medications or instructions on this list is different from what your Primary Care doctor has instructed you - please continue to take your medications as instructed by your Primary Care doctor until you can check with your do Medicaid plans. To get signed up and covered, please call [phone] and ask to get set up for an insurance coverage that is [place]-network [place] [address]. [place]     Sign up for [employer], your secure online medical record.   [employer]

## (undated) NOTE — ED AVS SNAPSHOT
Eric Negrete II   MRN: [id number]    Department:  [facility] Emergency Department   Date of Visit:  2/11/2018           Disclosure     Insurance plans vary and the physician(s) referred by the ER may not be covered by your plan.  Please conta within the next three months to obtain basic health screening including reassessment of your blood pressure.     IF THERE IS ANY CHANGE OR WORSENING OF YOUR CONDITION, CALL YOUR PRIMARY CARE PHYSICIAN AT ONCE OR RETURN IMMEDIATELY TO THE EMERGENCY DEPARTMEN

## (undated) NOTE — ED AVS SNAPSHOT
Gale Jacquie   MRN: [id number]    Department:  [facility] Emergency Department   Date of Visit:  10/12/2017           Disclosure     Insurance plans vary and the physician(s) referred by the ER may not be covered by your plan.  Please contact CARE PHYSICIAN AT ONCE OR RETURN IMMEDIATELY TO THE EMERGENCY DEPARTMENT. If you have been prescribed any medication(s), please fill your prescription right away and begin taking the medication(s) as directed.   If you believe that any of the medications

## (undated) NOTE — ED AVS SNAPSHOT
Eric Negrete II   MRN: [id number]    Department:  [facility] Emergency Department   Date of Visit:  12/30/2018           Disclosure     Insurance plans vary and the physician(s) referred by the ER may not be covered by your plan.  Please cont within the next three months to obtain basic health screening including reassessment of your blood pressure.     IF THERE IS ANY CHANGE OR WORSENING OF YOUR CONDITION, CALL YOUR PRIMARY CARE PHYSICIAN AT ONCE OR RETURN IMMEDIATELY TO THE EMERGENCY DEPARTMEN

## (undated) NOTE — ED AVS SNAPSHOT
Dino Nelson II   MRN: [id number]    Department:  [facility] Emergency Department   Date of Visit:  12/30/2018           Disclosure     Insurance plans vary and the physician(s) referred by the ER may not be covered by your plan.  Please cont within the next three months to obtain basic health screening including reassessment of your blood pressure.     IF THERE IS ANY CHANGE OR WORSENING OF YOUR CONDITION, CALL YOUR PRIMARY CARE PHYSICIAN AT ONCE OR RETURN IMMEDIATELY TO THE EMERGENCY DEPARTMEN

## (undated) NOTE — ED AVS SNAPSHOT
[facility] Emergency Department    [address]     [address] [place] [address]    Phone:  [phone]    Fax:  [phone]           Nikki Paz   MRN: [id number]    Department:  [facility] Emergency Department   Date of Visit:  6/1/ and Class Registration line at [phone] or find a doctor online by visiting [email or web address].    IF THERE IS ANY CHANGE OR WORSENING OF YOUR CONDITION, CALL YOUR PRIMARY CARE PHYSICIAN AT ONCE OR RETURN IMMEDIATELY TO [address] [place] [address].     If

## (undated) NOTE — ED AVS SNAPSHOT
[facility] Emergency Department    [address]     [address] [place] [address]    Phone:  [phone]    Fax:  [phone]           Kanwal Conley   MRN: [id number]    Department:  [facility] Emergency Department   Date of Visit:  2/3/ and Class Registration line at [phone] or find a doctor online by visiting [email or web address].    IF THERE IS ANY CHANGE OR WORSENING OF YOUR CONDITION, CALL YOUR PRIMARY CARE PHYSICIAN AT ONCE OR RETURN IMMEDIATELY TO [address] [place] [address].     If

## (undated) NOTE — ED AVS SNAPSHOT
Joann Villarreal FRANCY   MRN: [id number]    Department:  [facility] Emergency Department   Date of Visit:  8/1/2019           Disclosure     Insurance plans vary and the physician(s) referred by the ER may not be covered by your plan.  Please contac within the next three months to obtain basic health screening including reassessment of your blood pressure.     IF THERE IS ANY CHANGE OR WORSENING OF YOUR CONDITION, CALL YOUR PRIMARY CARE PHYSICIAN AT ONCE OR RETURN IMMEDIATELY TO THE EMERGENCY DEPARTMEN

## (undated) NOTE — ED AVS SNAPSHOT
Sheeba Kaiser II   MRN: [id number]    Department:  [facility] Emergency Department   Date of Visit:  5/16/2018           Disclosure     Insurance plans vary and the physician(s) referred by the ER may not be covered by your plan.  Please conta within the next three months to obtain basic health screening including reassessment of your blood pressure.     IF THERE IS ANY CHANGE OR WORSENING OF YOUR CONDITION, CALL YOUR PRIMARY CARE PHYSICIAN AT ONCE OR RETURN IMMEDIATELY TO THE EMERGENCY DEPARTMEN